# Patient Record
Sex: MALE | Race: WHITE | Employment: FULL TIME | URBAN - METROPOLITAN AREA
[De-identification: names, ages, dates, MRNs, and addresses within clinical notes are randomized per-mention and may not be internally consistent; named-entity substitution may affect disease eponyms.]

---

## 2021-07-11 ENCOUNTER — HOSPITAL ENCOUNTER (OUTPATIENT)
Dept: LAB | Age: 61
Setting detail: SPECIMEN
Discharge: HOME | End: 2021-07-11
Payer: COMMERCIAL

## 2021-07-11 DIAGNOSIS — Z01.818 PREOP TESTING: Primary | ICD-10-CM

## 2021-07-11 PROCEDURE — U0003 INFECTIOUS AGENT DETECTION BY NUCLEIC ACID (DNA OR RNA); SEVERE ACUTE RESPIRATORY SYNDROME CORONAVIRUS 2 (SARS-COV-2) (CORONAVIRUS DISEASE [COVID-19]), AMPLIFIED PROBE TECHNIQUE, MAKING USE OF HIGH THROUGHPUT TECHNOLOGIES AS DESCRIBED BY CMS-2020-01-R: HCPCS

## 2021-07-11 PROCEDURE — U0005 INFEC AGEN DETEC AMPLI PROBE: HCPCS

## 2021-07-12 LAB
SARS-COV-2: NORMAL
SARS-COV-2: NOT DETECTED
SOURCE: NORMAL

## 2021-07-15 ENCOUNTER — HOSPITAL ENCOUNTER (OUTPATIENT)
Age: 61
Setting detail: OUTPATIENT SURGERY
Discharge: HOME | End: 2021-07-15
Attending: PLASTIC SURGERY | Admitting: PLASTIC SURGERY
Payer: COMMERCIAL

## 2021-07-15 VITALS
SYSTOLIC BLOOD PRESSURE: 148 MMHG | BODY MASS INDEX: 31.78 KG/M2 | TEMPERATURE: 98 F | DIASTOLIC BLOOD PRESSURE: 86 MMHG | HEART RATE: 77 BPM | OXYGEN SATURATION: 100 % | HEIGHT: 71 IN | RESPIRATION RATE: 16 BRPM | WEIGHT: 227 LBS

## 2021-07-15 DIAGNOSIS — G89.18 PAIN FOLLOWING SURGERY OR PROCEDURE: Primary | ICD-10-CM

## 2021-07-15 PROBLEM — L02.212 ABSCESS OF BACK: Status: ACTIVE | Noted: 2021-07-15

## 2021-07-15 PROBLEM — L72.3 INFLAMED EPIDERMOID CYST OF SKIN: Status: ACTIVE | Noted: 2021-07-15

## 2021-07-15 PROCEDURE — 88304 TISSUE EXAM BY PATHOLOGIST: CPT

## 2021-07-15 PROCEDURE — 7100000010 HC PHASE II RECOVERY - FIRST 15 MIN: Performed by: PLASTIC SURGERY

## 2021-07-15 PROCEDURE — 3600000002 HC SURGERY LEVEL 2 BASE: Performed by: PLASTIC SURGERY

## 2021-07-15 PROCEDURE — 7100000011 HC PHASE II RECOVERY - ADDTL 15 MIN: Performed by: PLASTIC SURGERY

## 2021-07-15 PROCEDURE — 2709999900 HC NON-CHARGEABLE SUPPLY: Performed by: PLASTIC SURGERY

## 2021-07-15 PROCEDURE — 2500000003 HC RX 250 WO HCPCS: Performed by: PLASTIC SURGERY

## 2021-07-15 PROCEDURE — 3600000012 HC SURGERY LEVEL 2 ADDTL 15MIN: Performed by: PLASTIC SURGERY

## 2021-07-15 RX ORDER — BUPIVACAINE HYDROCHLORIDE AND EPINEPHRINE 2.5; 5 MG/ML; UG/ML
INJECTION, SOLUTION EPIDURAL; INFILTRATION; INTRACAUDAL; PERINEURAL PRN
Status: DISCONTINUED | OUTPATIENT
Start: 2021-07-15 | End: 2021-07-15 | Stop reason: ALTCHOICE

## 2021-07-15 RX ORDER — BUPIVACAINE HYDROCHLORIDE 2.5 MG/ML
INJECTION, SOLUTION INFILTRATION; PERINEURAL PRN
Status: DISCONTINUED | OUTPATIENT
Start: 2021-07-15 | End: 2021-07-15 | Stop reason: ALTCHOICE

## 2021-07-15 RX ORDER — HYDROCODONE BITARTRATE AND ACETAMINOPHEN 5; 325 MG/1; MG/1
1 TABLET ORAL EVERY 6 HOURS PRN
Qty: 28 TABLET | Refills: 0 | Status: SHIPPED | OUTPATIENT
Start: 2021-07-15 | End: 2021-07-22

## 2021-07-15 RX ORDER — BUPIVACAINE HYDROCHLORIDE AND EPINEPHRINE 2.5; 5 MG/ML; UG/ML
INJECTION, SOLUTION INFILTRATION; PERINEURAL
Status: DISCONTINUED
Start: 2021-07-15 | End: 2021-07-15 | Stop reason: HOSPADM

## 2021-07-15 RX ORDER — CLINDAMYCIN HYDROCHLORIDE 300 MG/1
300 CAPSULE ORAL 3 TIMES DAILY
Qty: 21 CAPSULE | Refills: 0 | Status: SHIPPED | OUTPATIENT
Start: 2021-07-15 | End: 2021-07-22

## 2021-07-15 ASSESSMENT — PAIN SCALES - GENERAL: PAINLEVEL_OUTOF10: 0

## 2021-07-15 ASSESSMENT — PAIN - FUNCTIONAL ASSESSMENT: PAIN_FUNCTIONAL_ASSESSMENT: 0-10

## 2021-07-15 NOTE — PROGRESS NOTES
PT STATES WENT TO er LAST WEEK, HAD ARE ON BACK DRAINED ET THINKS THAT dR MAY WANT TO LOOK AT TODAY. tHIS IS THIRD area.  Pt aware that 2 areas are scheduled for removal today, but will talk with dr about excising 3rd area

## 2021-07-15 NOTE — PROGRESS NOTES
CLINICAL PHARMACY NOTE: MEDS TO BEDS    Total # of Prescriptions Filled: 2   The following medications were delivered to the patient:  · Norco 5-325  · Clindamycin 300mg    Additional Documentation:

## 2021-07-15 NOTE — OP NOTE
Operative Note      Patient: Lyle Royal  YOB: 1960  MRN: [id number]    Date of Procedure: 7/15/2021    Pre-Op Diagnosis: BACK CYST X 3    Post-Op Diagnosis: Same       Procedure(s):  BACK CYST X 2  BIOPSY EXCISION POSTERIOR RIGHT SHOULDER CYST X1 BIOPSY ECXISION  1. Excisional biopsy of cystic lesion mid back upper, excised diameter 3 cm closed with a 3.5 cm multilayer closure. 2.  Excisional biopsy of cystic lesion mid back lower, excised diameter 3.5 cm closed with a 4 cm multilayer closure. 3.  Excisional biopsy of cystic lesion right posterior shoulder, excised diameter 2.5 cm closed with a 3.2 cm multilayer closure  Surgeon(s): Cedrick Ro MD    Assistant:   * No surgical staff found *    Anesthesia: Local    Estimated Blood Loss (mL): less than 50     Complications: None    Specimens:   ID Type Source Tests Collected by Time Destination   A : right posterior shoulder Tissue Tissue SURGICAL PATHOLOGY A Katherine Hansen MD 7/15/2021 1504    B : mid back lower Tissue Tissue SURGICAL PATHOLOGY FRANCISCA Hansen MD 7/15/2021 1513    C : mid back upper Tissue Tissue SURGICAL PATHOLOGY A Katherine Hansen MD 7/15/2021 1515        Implants:  * No implants in log *      Drains: * No LDAs found *    Detailed Description of Procedure: The patient was brought to the operating room and placed in the prone position. His back and right posterior shoulder were prepped and draped in sterile fashion. 0.25% Marcaine with epinephrine was injected into the tissue surrounding each of the 3 cystic lesions on his back. Each cystic lesion was excised with a #15 blade and submitted for specimen. The defect was each closed in a multilayered fashion with 3-0 Monocryl deep dermal layer, 3-0 Monocryl dermal layer, and 3-0 Monocryl running subcuticular on the skin followed by Dermabond and Steri-Strips.   1.  Excisional biopsy of cystic lesion mid back upper, excised diameter 3 cm closed with a 3.5 cm multilayer closure. 2.  Excisional biopsy of cystic lesion mid back lower, excised diameter 3.5 cm closed with a 4 cm multilayer closure.   3.  Excisional biopsy of cystic lesion right posterior shoulder, excised diameter 2.5 cm closed with a 3.2 cm multilayer closure      Electronically signed by Elena Smith MD on 7/15/2021 at 5:16 PM

## 2021-07-19 LAB — DERMATOLOGY PATHOLOGY REPORT: NORMAL

## 2022-09-20 NOTE — H&P
Office Note     VIKTORIYA Peacock MD, FACS     Subjective:      Patient ID: Sweetie Philip is a 62 y.o. male.     HPI  Patient presents following a large draining abscess on his upper back. He has had previous history of cystic lesions on his back that have been drained. He was started on Cleocin and the upper back cyst ruptured and was draining purulent fluid. He comes in today for evaluation of the cystic lesion on his upper back that is draining as well as a lesion that is just below that.   It has become very inflamed and painful.     Review of Systems     Past Medical History        Past Medical History:   Diagnosis Date    Hypertension           Past Surgical History         Past Surgical History:   Procedure Laterality Date    PILONIDAL CYST EXCISION        PRE-MALIGNANT / BENIGN SKIN LESION EXCISION        WISDOM TOOTH EXTRACTION                  Allergies   Allergen Reactions    Pcn [Penicillins]        Current Facility-Administered Medications          Current Outpatient Medications   Medication Sig Dispense Refill    sulfamethoxazole-trimethoprim (BACTRIM DS) 800-160 MG per tablet Take 1 tablet by mouth 2 times daily for 10 days 20 tablet 0    lisinopril (PRINIVIL;ZESTRIL) 20 MG tablet Take 20 mg by mouth daily.        Vitamin D (CHOLECALCIFEROL) 1000 UNITS CAPS capsule Take 1,000 Units by mouth daily.        lisinopril-hydrochlorothiazide (PRINZIDE;ZESTORETIC) 10-12.5 MG per tablet Take 1 tablet by mouth daily.        aspirin 81 MG tablet Take 81 mg by mouth daily.          No current facility-administered medications for this visit.          Social History               Socioeconomic History    Marital status: Single       Spouse name: Not on file    Number of children: Not on file    Years of education: Not on file    Highest education level: Not on file   Occupational History    Not on file   Social Needs    Financial resource strain: Not on file    Food insecurity     Worry: Not on file       Inability: Not on file    Transportation needs       Medical: Not on file       Non-medical: Not on file   Tobacco Use    Smoking status: Never Smoker    Smokeless tobacco: Never Used   Substance and Sexual Activity    Alcohol use: Not Currently    Drug use: Never    Sexual activity: Not on file   Lifestyle    Physical activity       Days per week: Not on file       Minutes per session: Not on file    Stress: Not on file   Relationships    Social connections       Talks on phone: Not on file       Gets together: Not on file       Attends Religion service: Not on file       Active member of club or organization: Not on file       Attends meetings of clubs or organizations: Not on file       Relationship status: Not on file    Intimate partner violence       Fear of current or ex partner: Not on file       Emotionally abused: Not on file       Physically abused: Not on file       Forced sexual activity: Not on file   Other Topics Concern    Not on file   Social History Narrative    Not on file         Family History   History reviewed. No pertinent family history. Review of systems is otherwise negative. BP (!) 156/92   Pulse 83   Temp 97.2 °F (36.2 °C) (Skin)   Ht 5' 11\" (1.803 m)   Wt 215 lb (97.5 kg)   BMI 29.99 kg/m²         Objective:   Physical Exam  Vitals signs and nursing note reviewed. Constitutional:       General: He is not in acute distress. Appearance: He is well-developed. He is not diaphoretic. HENT:      Head: Normocephalic and atraumatic. Nose: Nose normal.      Mouth/Throat:      Mouth: Mucous membranes are moist.      Pharynx: Oropharynx is clear. Eyes:      General:         Right eye: No discharge. Left eye: No discharge. Conjunctiva/sclera: Conjunctivae normal.      Pupils: Pupils are equal, round, and reactive to light. Neck:      Musculoskeletal: Normal range of motion. Thyroid: No thyromegaly. Vascular: No JVD. Trachea: No tracheal deviation. Cardiovascular:      Rate and Rhythm: Normal rate. Pulmonary:      Effort: Pulmonary effort is normal. No respiratory distress. Breath sounds: No wheezing. Abdominal:      General: There is no distension. Palpations: Abdomen is soft. Musculoskeletal: Normal range of motion. Skin:     General: Skin is warm and dry. Comments: Large cystic lesion upper back draining purulent fluid. Cyst measuring 2x2 cm. Second large cystic lesion left upper back just below inflamed lesion with draining punctum, 3x3 cm   Neurological:      Mental Status: He is alert and oriented to person, place, and time. Cranial Nerves: No cranial nerve deficit. Psychiatric:         Behavior: Behavior normal.         Thought Content: Thought content normal.         Judgment: Judgment normal.         Assessment:   Multiple cysts back                Plan:   Patient will have his cysts incised and drained in the office today. They become inflamed and are very painful.     PROC:  Under local anesthesia today the large abscess on his back was incised and drained. He had a secondary abscess just superior to this abscess which was also incised and drained. Copious amount of purulent fluid was expressed at both sites.                         The patient was evaluated and examined with my nurse in the room at all times. Portions of this note were transcribed using Dragon voice recognition technology and as such may reflect some variations in voice recognition.     COVID-19 precautions were taken throughout the entire office visit. Patient was screened for COVID-19 symptoms and temperature was taken prior to coming back to the exam room. A mask as well as gloves was worn throughout the entire office visit and distancing maintained as much as possible in between the physical examination periods.     A Katie Lund MD    Patient was evaluated preoperatively. Plan on excising the sac and cyst from his back as a unit under local anesthesia. Patient understands the above and wished to proceed. Initial Size Of Lesion: 1.4

## 2022-11-23 ENCOUNTER — HOSPITAL ENCOUNTER (OUTPATIENT)
Age: 62
Discharge: HOME | End: 2022-11-23

## 2022-11-23 LAB — RUBV IGG SER QL: 90.6 IU/ML

## 2022-11-23 PROCEDURE — 86762 RUBELLA ANTIBODY: CPT

## 2022-11-23 PROCEDURE — 86735 MUMPS ANTIBODY: CPT

## 2022-11-23 PROCEDURE — 86787 VARICELLA-ZOSTER ANTIBODY: CPT

## 2022-11-23 PROCEDURE — 86480 TB TEST CELL IMMUN MEASURE: CPT

## 2022-11-23 PROCEDURE — 86765 RUBEOLA ANTIBODY: CPT

## 2022-11-25 LAB
MEASLES ANTIBODY IGG: 2.65
MUV IGG SER QL: 0.74
QUANTI TB GOLD PLUS: NEGATIVE
QUANTI TB1 MINUS NIL: 0.05 IU/ML (ref 0–0.34)
QUANTI TB2 MINUS NIL: 0.04 IU/ML (ref 0–0.34)
QUANTIFERON MITOGEN: >10 IU/ML
QUANTIFERON NIL: 0.02 IU/ML
VZV IGG SER QL IA: 2.41

## (undated) DEVICE — STRIP,CLOSURE,WOUND,MEDI-STRIP,1/2X4: Brand: MEDLINE

## (undated) DEVICE — SOLUTION IV IRRIG POUR BRL 0.9% SODIUM CHL 2F7124

## (undated) DEVICE — INTENDED FOR TISSUE SEPARATION, AND OTHER PROCEDURES THAT REQUIRE A SHARP SURGICAL BLADE TO PUNCTURE OR CUT.: Brand: BARD-PARKER ® SAFETYLOCK CARBON RIB-BACK BLADES

## (undated) DEVICE — BLADE ES ELASTOMERIC COAT INSUL DURABLE BEND UPTO 90DEG

## (undated) DEVICE — STANDARD HYPODERMIC NEEDLE,POLYPROPYLENE HUB: Brand: MONOJECT

## (undated) DEVICE — ELECTRODE PT RET AD L9FT HI MOIST COND ADH HYDRGEL CORDED

## (undated) DEVICE — ADHESIVE SKIN CLSR 0.7ML TOP DERMBND ADV

## (undated) DEVICE — SYRINGE MED 10ML LUERLOCK TIP W/O SFTY DISP

## (undated) DEVICE — STERILE POLYISOPRENE POWDER-FREE SURGICAL GLOVES WITH EMOLLIENT COATING: Brand: PROTEXIS

## (undated) DEVICE — Z DISCONTINUED BY MEDLINE USE 2711682 TRAY SKIN PREP DRY W/ PREM GLV

## (undated) DEVICE — PENCIL ES L3M BTTN SWCH HOLSTER W/ BLDE ELECTRD EDGE

## (undated) DEVICE — STERILE POLYISOPRENE POWDER-FREE SURGICAL GLOVES: Brand: PROTEXIS

## (undated) DEVICE — SOLUTION SURG PREP ANTIMICROBIAL 4 OZ SKIN WND EXIDINE

## (undated) DEVICE — SUTURE MCRYL SZ 3 0 L18IN ABSRB UD PS 2 3 8 CIR REV CUT NDL MCP497G